# Patient Record
Sex: MALE | Race: WHITE | Employment: STUDENT | URBAN - METROPOLITAN AREA
[De-identification: names, ages, dates, MRNs, and addresses within clinical notes are randomized per-mention and may not be internally consistent; named-entity substitution may affect disease eponyms.]

---

## 2021-03-15 ENCOUNTER — HOSPITAL ENCOUNTER (EMERGENCY)
Facility: CLINIC | Age: 21
Discharge: HOME | End: 2021-03-15
Attending: EMERGENCY MEDICINE | Admitting: EMERGENCY MEDICINE
Payer: COMMERCIAL

## 2021-03-15 VITALS
SYSTOLIC BLOOD PRESSURE: 129 MMHG | RESPIRATION RATE: 20 BRPM | TEMPERATURE: 97.1 F | DIASTOLIC BLOOD PRESSURE: 81 MMHG | HEART RATE: 79 BPM | OXYGEN SATURATION: 96 %

## 2021-03-15 DIAGNOSIS — N23 RENAL COLIC: ICD-10-CM

## 2021-03-15 PROCEDURE — 258N000003 HC RX IP 258 OP 636: Performed by: EMERGENCY MEDICINE

## 2021-03-15 PROCEDURE — 96361 HYDRATE IV INFUSION ADD-ON: CPT | Performed by: EMERGENCY MEDICINE

## 2021-03-15 PROCEDURE — 250N000011 HC RX IP 250 OP 636: Performed by: EMERGENCY MEDICINE

## 2021-03-15 PROCEDURE — 96374 THER/PROPH/DIAG INJ IV PUSH: CPT | Performed by: EMERGENCY MEDICINE

## 2021-03-15 PROCEDURE — 99285 EMERGENCY DEPT VISIT HI MDM: CPT | Performed by: EMERGENCY MEDICINE

## 2021-03-15 PROCEDURE — 96376 TX/PRO/DX INJ SAME DRUG ADON: CPT | Performed by: EMERGENCY MEDICINE

## 2021-03-15 PROCEDURE — 250N000013 HC RX MED GY IP 250 OP 250 PS 637: Performed by: EMERGENCY MEDICINE

## 2021-03-15 PROCEDURE — 96375 TX/PRO/DX INJ NEW DRUG ADDON: CPT | Performed by: EMERGENCY MEDICINE

## 2021-03-15 PROCEDURE — 99285 EMERGENCY DEPT VISIT HI MDM: CPT | Mod: 25 | Performed by: EMERGENCY MEDICINE

## 2021-03-15 RX ORDER — OXYCODONE HYDROCHLORIDE 5 MG/1
5 TABLET ORAL EVERY 6 HOURS PRN
Qty: 12 TABLET | Refills: 0 | Status: SHIPPED | OUTPATIENT
Start: 2021-03-15

## 2021-03-15 RX ORDER — HYDROMORPHONE HYDROCHLORIDE 1 MG/ML
0.5 INJECTION, SOLUTION INTRAMUSCULAR; INTRAVENOUS; SUBCUTANEOUS ONCE
Status: COMPLETED | OUTPATIENT
Start: 2021-03-15 | End: 2021-03-15

## 2021-03-15 RX ORDER — TAMSULOSIN HYDROCHLORIDE 0.4 MG/1
0.4 CAPSULE ORAL ONCE
Status: COMPLETED | OUTPATIENT
Start: 2021-03-15 | End: 2021-03-15

## 2021-03-15 RX ORDER — ONDANSETRON 2 MG/ML
4 INJECTION INTRAMUSCULAR; INTRAVENOUS ONCE
Status: COMPLETED | OUTPATIENT
Start: 2021-03-15 | End: 2021-03-15

## 2021-03-15 RX ORDER — OXYCODONE HYDROCHLORIDE 5 MG/1
5 TABLET ORAL ONCE
Status: COMPLETED | OUTPATIENT
Start: 2021-03-15 | End: 2021-03-15

## 2021-03-15 RX ORDER — ONDANSETRON 4 MG/1
4 TABLET, ORALLY DISINTEGRATING ORAL EVERY 6 HOURS PRN
Qty: 10 TABLET | Refills: 0 | Status: SHIPPED | OUTPATIENT
Start: 2021-03-15 | End: 2021-03-18

## 2021-03-15 RX ORDER — TAMSULOSIN HYDROCHLORIDE 0.4 MG/1
0.4 CAPSULE ORAL DAILY
Qty: 10 CAPSULE | Refills: 0 | Status: SHIPPED | OUTPATIENT
Start: 2021-03-15 | End: 2021-03-25

## 2021-03-15 RX ADMIN — ONDANSETRON 4 MG: 2 INJECTION INTRAMUSCULAR; INTRAVENOUS at 16:16

## 2021-03-15 RX ADMIN — TAMSULOSIN HYDROCHLORIDE 0.4 MG: 0.4 CAPSULE ORAL at 15:40

## 2021-03-15 RX ADMIN — HYDROMORPHONE HYDROCHLORIDE 0.5 MG: 1 INJECTION, SOLUTION INTRAMUSCULAR; INTRAVENOUS; SUBCUTANEOUS at 16:17

## 2021-03-15 RX ADMIN — ONDANSETRON 4 MG: 2 INJECTION INTRAMUSCULAR; INTRAVENOUS at 15:36

## 2021-03-15 RX ADMIN — HYDROMORPHONE HYDROCHLORIDE 0.5 MG: 1 INJECTION, SOLUTION INTRAMUSCULAR; INTRAVENOUS; SUBCUTANEOUS at 15:38

## 2021-03-15 RX ADMIN — OXYCODONE HYDROCHLORIDE 5 MG: 5 TABLET ORAL at 16:34

## 2021-03-15 RX ADMIN — SODIUM CHLORIDE 1000 ML: 9 INJECTION, SOLUTION INTRAVENOUS at 15:36

## 2021-03-15 ASSESSMENT — ENCOUNTER SYMPTOMS
ABDOMINAL PAIN: 1
NAUSEA: 1

## 2021-03-15 NOTE — ED TRIAGE NOTES
Patient awake and alert. Respirations even and unlabored. Skin warm and dry. Lower midline abdominal pain. Seen at the [facility] Diagnosed with kidney stone (CT results, 2 x 2 x 2 mm calculus at the left urethrovesical junction resulting in mild obstructive changes). Three emesis today. Patient spoke with uncle who is a surgeon and instructed patient to come to the emergency room due to the severe pain, size of stone, and difficultly voiding.

## 2021-03-15 NOTE — DISCHARGE INSTRUCTIONS
Your prescriptions were E scribed to our discharge pharmacy for pickup.    Strain your urine for stones and save any found for your follow-up appointment.    Please make an appointment to follow up with the urology referral made for you by the urgency center or with our Urology Clinic (phone: [phone]) in the next week for recheck.    Return to the ER for worsening or fever.

## 2021-03-15 NOTE — ED PROVIDER NOTES
[facility] EMERGENCY DEPARTMENT ([facility])  3/15/21  History     Chief Complaint   Patient presents with     Flank Pain     The history is provided by the patient and medical records.     Nick Villagran is a 21 year old male who presents the Emergency Department after being seen at urgency [facility] this morning.  Patient states that yesterday morning he developed some left lower quadrant abdominal pain and thought it was just a stomachache.  Patient states he woke up this morning and the pain was worse associated with nausea.  Patient was seen at the urgency center and was diagnosed with a left-sided 2 mm kidney stone at the UV junction.  Patient was given Toradol intravenously some Zofran and some oxycodone to go home.  The patient was noted on their CT scan to have some mild left-sided hydronephrosis and hydroureter due to the stone.  Patient's other laboratory results done at the urgency room revealed a creatinine of 0.9 and otherwise normal electrolytes with a hemoglobin of 16.2, a white count of 4.5 and urinalysis revealing  red cells with no white cells and no bacteria.  Patient went home from the urgency center and did not fill his oxycodone prescription.  He states the pain worsened and he called his surgeon relative who recommended he come to the ER for further treatment.  Patient states he got no IV fluids and no Flomax during his treatment at the urgency center.      EXAM: CT ABDOMEN PELVIS STONE PROTOCOL WO  LOCATION: [facility] [place]  DATE/TIME: 3/15/2021 11:44 AM    INDICATION: Left flank pain.  COMPARISON: None.  TECHNIQUE: CT scan of the abdomen and pelvis was performed without oral or IV contrast. Multiplanar reformats were obtained. Dose reduction techniques were used.  CONTRAST: None.    FINDINGS:   LOWER CHEST: Normal.    HEPATOBILIARY: Noncontrast images are unremarkable.    PANCREAS: Normal.    SPLEEN: Normal.    ADRENAL GLANDS: Normal.    KIDNEY/BLADDER: There is  mild left-sided hydronephrosis and hydroureter due to a 2 x 2 by 2 mm calculus at the left ureterovesical junction. No perinephric stranding. There is another punctate intrarenal calculus in the upper pole of the left kidney. No stone burden on the right. No bladder calculi.    BOWEL: Sliver of free fluid in the pelvis. Appendix is normal.    LYMPH NODES: Normal.    VASCULATURE: Unremarkable.    PELVIC ORGANS: Normal.    MUSCULOSKELETAL: Few scattered bone islands in the pelvis and both femurs.    IMPRESSION:   1.  There is a 2 x 2 x 2 mm calculus at the left ureterovesical junction resulting in mild obstructive changes. There is an additional punctate calculus upper pole of the left kidney.    Component Name  3/15/2021     51 - 100 (A)   None Seen   None Seen   Few   Mucus Present   Chiquis (A)   Slightly Cloudy (A)   1.025   6.0   Normal   100 (A)   Negative   Trace (A)   Abnormal (A)   Large (A)   Negative   Negative   RBC   WBC   BACTERIA                    EPITHELIAL CELLS            OTHER   COLOR                       CLARITY                     SPECIFIC GRAVITY,URINE      PH,URINE                    UROBILINOGEN,QUALITATIVE    PROTEIN, URINE   GLUCOSE, URINE   KETONES,URINE               BILIRUBIN,URINE             OCCULT BLOOD,URINE          NITRITE                     LEUKOCYTE ESTERASE              This part of the document was transcribed by [employer].    History reviewed. No pertinent past medical history.    History reviewed. No pertinent surgical history.    No family history on file.    Social History     Tobacco Use     Smoking status: Not on file   Substance Use Topics     Alcohol use: Not on file        No Known Allergies      Review of Systems   Gastrointestinal: Positive for abdominal pain and nausea.   All other systems reviewed and are negative.    A complete review of systems was performed with pertinent positives and negatives noted in the HPI, and all other systems  negative.    Physical Exam   BP: (!) 147/98  Pulse: 89  Temp: 97.1  F (36.2  C)  Resp: 20  SpO2: 100 %  Physical Exam  Vitals signs and nursing note reviewed.   HENT:      Head: Atraumatic.   Eyes:      Pupils: Pupils are equal, round, and reactive to light.   Neck:      Musculoskeletal: Neck supple.   Cardiovascular:      Heart sounds: Normal heart sounds.   Pulmonary:      Breath sounds: Normal breath sounds.   Abdominal:      Palpations: Abdomen is soft.      Tenderness: There is no abdominal tenderness.   Neurological:      General: No focal deficit present.      Mental Status: He is alert and oriented to person, place, and time.   Psychiatric:         Mood and Affect: Mood normal.             ED Course      Procedures        CT and labs were reviewed from the urgency Center    Medications   sodium chloride (PF) 0.9% PF flush 3 mL (has no administration in time range)   sodium chloride (PF) 0.9% PF flush 3 mL (has no administration in time range)   0.9% sodium chloride BOLUS (0 mLs Intravenous Stopped 3/15/21 1618)   HYDROmorphone (PF) (DILAUDID) injection 0.5 mg (0.5 mg Intravenous Given 3/15/21 1538)   oxyCODONE (ROXICODONE) tablet 5 mg (5 mg Oral Given 3/15/21 1634)   tamsulosin (FLOMAX) capsule 0.4 mg (0.4 mg Oral Given 3/15/21 1540)   ondansetron (ZOFRAN) injection 4 mg (4 mg Intravenous Given 3/15/21 1536)   HYDROmorphone (PF) (DILAUDID) injection 0.5 mg (0.5 mg Intravenous Given 3/15/21 1617)   ondansetron (ZOFRAN) injection 4 mg (4 mg Intravenous Given 3/15/21 1616)        Assessments & Plan (with Medical Decision Making)     I have reviewed the nursing notes. I have reviewed the findings, diagnosis, plan and need for follow up with the patient.    New Prescriptions    ONDANSETRON (ZOFRAN ODT) 4 MG ODT TAB    Take 1 tablet (4 mg) by mouth every 6 hours as needed for nausea    OXYCODONE (ROXICODONE) 5 MG TABLET    Take 1 tablet (5 mg) by mouth every 6 hours as needed for pain    TAMSULOSIN (FLOMAX) 0.4 MG  CAPSULE    Take 1 capsule (0.4 mg) by mouth daily for 10 doses   Patient's oxycodone prescription from the urgency Center was ripped up and recycled and and in its place I sent a prescription E scribed to our discharge pharmacy    Final diagnoses:   Renal colic - with 2mm Left UVJ stone     Your prescriptions were E scribed to our discharge pharmacy for pickup.    Strain your urine for stones and save any found for your follow-up appointment.    Please make an appointment to follow up with the urology referral made for you by the urgency center or with our Urology Clinic (phone: [phone]) in the next week for recheck.    Return to the ER for worsening or fever.    Routine discharge instructions were given for this diagnosis  --  Miguelangel Desai MD  [facility] EMERGENCY DEPARTMENT  3/15/2021     Miguelangel Downing MD  03/15/21 2832

## 2021-03-17 ENCOUNTER — HOSPITAL ENCOUNTER (EMERGENCY)
Facility: CLINIC | Age: 21
Discharge: HOME | End: 2021-03-17
Attending: EMERGENCY MEDICINE | Admitting: EMERGENCY MEDICINE
Payer: COMMERCIAL

## 2021-03-17 VITALS
OXYGEN SATURATION: 100 % | HEART RATE: 56 BPM | TEMPERATURE: 98.1 F | DIASTOLIC BLOOD PRESSURE: 85 MMHG | WEIGHT: 178.2 LBS | BODY MASS INDEX: 22.16 KG/M2 | SYSTOLIC BLOOD PRESSURE: 138 MMHG | HEIGHT: 75 IN | RESPIRATION RATE: 16 BRPM

## 2021-03-17 DIAGNOSIS — N20.1 URETERAL STONE: ICD-10-CM

## 2021-03-17 DIAGNOSIS — N23 RENAL COLIC: ICD-10-CM

## 2021-03-17 LAB
ALBUMIN UR-MCNC: NEGATIVE MG/DL
ANION GAP SERPL CALCULATED.3IONS-SCNC: 7 MMOL/L (ref 3–14)
APPEARANCE UR: CLEAR
BASOPHILS # BLD AUTO: 0 10E9/L (ref 0–0.2)
BASOPHILS NFR BLD AUTO: 0.8 %
BILIRUB UR QL STRIP: NEGATIVE
BUN SERPL-MCNC: 9 MG/DL (ref 7–30)
CALCIUM SERPL-MCNC: 9.7 MG/DL (ref 8.5–10.1)
CHLORIDE SERPL-SCNC: 105 MMOL/L (ref 94–109)
CO2 SERPL-SCNC: 26 MMOL/L (ref 20–32)
COLOR UR AUTO: ABNORMAL
CREAT SERPL-MCNC: 0.94 MG/DL (ref 0.66–1.25)
DIFFERENTIAL METHOD BLD: NORMAL
EOSINOPHIL # BLD AUTO: 0.1 10E9/L (ref 0–0.7)
EOSINOPHIL NFR BLD AUTO: 1 %
ERYTHROCYTE [DISTWIDTH] IN BLOOD BY AUTOMATED COUNT: 11.3 % (ref 10–15)
GFR SERPL CREATININE-BSD FRML MDRD: >90 ML/MIN/{1.73_M2}
GLUCOSE SERPL-MCNC: 93 MG/DL (ref 70–99)
GLUCOSE UR STRIP-MCNC: NEGATIVE MG/DL
HCT VFR BLD AUTO: 44 % (ref 40–53)
HGB BLD-MCNC: 15.5 G/DL (ref 13.3–17.7)
HGB UR QL STRIP: ABNORMAL
IMM GRANULOCYTES # BLD: 0 10E9/L (ref 0–0.4)
IMM GRANULOCYTES NFR BLD: 0.4 %
KETONES UR STRIP-MCNC: NEGATIVE MG/DL
LEUKOCYTE ESTERASE UR QL STRIP: NEGATIVE
LYMPHOCYTES # BLD AUTO: 1.4 10E9/L (ref 0.8–5.3)
LYMPHOCYTES NFR BLD AUTO: 26 %
MCH RBC QN AUTO: 30.5 PG (ref 26.5–33)
MCHC RBC AUTO-ENTMCNC: 35.2 G/DL (ref 31.5–36.5)
MCV RBC AUTO: 87 FL (ref 78–100)
MONOCYTES # BLD AUTO: 0.5 10E9/L (ref 0–1.3)
MONOCYTES NFR BLD AUTO: 9 %
MUCOUS THREADS #/AREA URNS LPF: PRESENT /LPF
NEUTROPHILS # BLD AUTO: 3.3 10E9/L (ref 1.6–8.3)
NEUTROPHILS NFR BLD AUTO: 62.8 %
NITRATE UR QL: NEGATIVE
NRBC # BLD AUTO: 0 10*3/UL
NRBC BLD AUTO-RTO: 0 /100
PH UR STRIP: 7.5 PH (ref 5–7)
PLATELET # BLD AUTO: 212 10E9/L (ref 150–450)
POTASSIUM SERPL-SCNC: 3.7 MMOL/L (ref 3.4–5.3)
RBC # BLD AUTO: 5.08 10E12/L (ref 4.4–5.9)
RBC #/AREA URNS AUTO: 17 /HPF (ref 0–2)
SODIUM SERPL-SCNC: 138 MMOL/L (ref 133–144)
SOURCE: ABNORMAL
SP GR UR STRIP: 1.01 (ref 1–1.03)
SQUAMOUS #/AREA URNS AUTO: 0 /HPF (ref 0–1)
UROBILINOGEN UR STRIP-MCNC: NORMAL MG/DL (ref 0–2)
WBC # BLD AUTO: 5.2 10E9/L (ref 4–11)
WBC #/AREA URNS AUTO: 0 /HPF (ref 0–5)

## 2021-03-17 PROCEDURE — 85025 COMPLETE CBC W/AUTO DIFF WBC: CPT | Performed by: EMERGENCY MEDICINE

## 2021-03-17 PROCEDURE — 96375 TX/PRO/DX INJ NEW DRUG ADDON: CPT | Performed by: EMERGENCY MEDICINE

## 2021-03-17 PROCEDURE — 96361 HYDRATE IV INFUSION ADD-ON: CPT | Performed by: EMERGENCY MEDICINE

## 2021-03-17 PROCEDURE — 99282 EMERGENCY DEPT VISIT SF MDM: CPT | Performed by: PHYSICIAN ASSISTANT

## 2021-03-17 PROCEDURE — 99285 EMERGENCY DEPT VISIT HI MDM: CPT | Performed by: EMERGENCY MEDICINE

## 2021-03-17 PROCEDURE — 99285 EMERGENCY DEPT VISIT HI MDM: CPT | Mod: 25 | Performed by: EMERGENCY MEDICINE

## 2021-03-17 PROCEDURE — 96374 THER/PROPH/DIAG INJ IV PUSH: CPT | Performed by: EMERGENCY MEDICINE

## 2021-03-17 PROCEDURE — 96376 TX/PRO/DX INJ SAME DRUG ADON: CPT | Performed by: EMERGENCY MEDICINE

## 2021-03-17 PROCEDURE — 258N000003 HC RX IP 258 OP 636: Performed by: EMERGENCY MEDICINE

## 2021-03-17 PROCEDURE — 80048 BASIC METABOLIC PNL TOTAL CA: CPT | Performed by: EMERGENCY MEDICINE

## 2021-03-17 PROCEDURE — 250N000011 HC RX IP 250 OP 636: Performed by: EMERGENCY MEDICINE

## 2021-03-17 PROCEDURE — 81001 URINALYSIS AUTO W/SCOPE: CPT | Performed by: EMERGENCY MEDICINE

## 2021-03-17 PROCEDURE — 250N000013 HC RX MED GY IP 250 OP 250 PS 637: Performed by: EMERGENCY MEDICINE

## 2021-03-17 RX ORDER — HYDROMORPHONE HYDROCHLORIDE 1 MG/ML
0.5 INJECTION, SOLUTION INTRAMUSCULAR; INTRAVENOUS; SUBCUTANEOUS
Status: DISCONTINUED | OUTPATIENT
Start: 2021-03-17 | End: 2021-03-17 | Stop reason: HOSPADM

## 2021-03-17 RX ORDER — KETOROLAC TROMETHAMINE 30 MG/ML
30 INJECTION, SOLUTION INTRAMUSCULAR; INTRAVENOUS ONCE
Status: COMPLETED | OUTPATIENT
Start: 2021-03-17 | End: 2021-03-17

## 2021-03-17 RX ORDER — PROCHLORPERAZINE 25 MG
25 SUPPOSITORY, RECTAL RECTAL EVERY 12 HOURS PRN
Qty: 8 SUPPOSITORY | Refills: 0 | Status: SHIPPED | OUTPATIENT
Start: 2021-03-17

## 2021-03-17 RX ORDER — TAMSULOSIN HYDROCHLORIDE 0.4 MG/1
0.4 CAPSULE ORAL DAILY
Status: DISCONTINUED | OUTPATIENT
Start: 2021-03-17 | End: 2021-03-17 | Stop reason: HOSPADM

## 2021-03-17 RX ORDER — ONDANSETRON 2 MG/ML
4 INJECTION INTRAMUSCULAR; INTRAVENOUS
Status: COMPLETED | OUTPATIENT
Start: 2021-03-17 | End: 2021-03-17

## 2021-03-17 RX ADMIN — HYDROMORPHONE HYDROCHLORIDE 0.5 MG: 1 INJECTION, SOLUTION INTRAMUSCULAR; INTRAVENOUS; SUBCUTANEOUS at 12:00

## 2021-03-17 RX ADMIN — KETOROLAC TROMETHAMINE 30 MG: 30 INJECTION, SOLUTION INTRAMUSCULAR; INTRAVENOUS at 11:22

## 2021-03-17 RX ADMIN — PROCHLORPERAZINE EDISYLATE 5 MG: 5 INJECTION INTRAMUSCULAR; INTRAVENOUS at 12:58

## 2021-03-17 RX ADMIN — TAMSULOSIN HYDROCHLORIDE 0.4 MG: 0.4 CAPSULE ORAL at 12:58

## 2021-03-17 RX ADMIN — ONDANSETRON 4 MG: 2 INJECTION INTRAMUSCULAR; INTRAVENOUS at 11:00

## 2021-03-17 RX ADMIN — SODIUM CHLORIDE 2000 ML: 9 INJECTION, SOLUTION INTRAVENOUS at 11:23

## 2021-03-17 RX ADMIN — HYDROMORPHONE HYDROCHLORIDE 0.5 MG: 1 INJECTION, SOLUTION INTRAMUSCULAR; INTRAVENOUS; SUBCUTANEOUS at 14:27

## 2021-03-17 ASSESSMENT — ENCOUNTER SYMPTOMS
DIAPHORESIS: 1
HEADACHES: 0
VOMITING: 1
FEVER: 0
CONFUSION: 0
EYE REDNESS: 0
COUGH: 0
DIFFICULTY URINATING: 0
SHORTNESS OF BREATH: 0
NAUSEA: 1
ARTHRALGIAS: 0
COLOR CHANGE: 0
ABDOMINAL PAIN: 0
NECK STIFFNESS: 0
FLANK PAIN: 1

## 2021-03-17 ASSESSMENT — MIFFLIN-ST. JEOR: SCORE: 1898.94

## 2021-03-17 NOTE — CONSULTS
Urology Consult History and Physical    Name: Nick Villagran    MRN: [id number]   YOB: 2000       We were asked to see Nick Villagran at the request of Dr. Turk for evaluation and treatment of the following chief complaint:          Chief Complaint:   Left obstructing ureteral stone    History is obtained from patient and review of records          History of Present Illness:   Nick Villagran is a 21 year old male with developed sudden LLQ abdominal pain on 3/15/21 and presented to an [facility] Emergency room for LLQ abdominal pain.  He was found on CT to have a [id number] left UVJ stone.  Labs shows normal creatinine 0.9mg/dL, WBC 4.5, and UA with blood only.  He was discharged on flomax when his pain was controlled, but re-presented to [facility] Ed the same day with worsened pain.  Again, his pain was able to be controlled and he was discharged on flomax, oxycodone and odansetron and given a urine strainer.     Today he re-presents to the [facility] ED with a pain flare associated with nausea and emesis.  Here in the ED his sCr is 0.94, WBC 5.2 and UA shows only RBCs.  There is no new imaging He denies dysuria and feels he is voiding normally. His urine has been intermittently dark-colored.  His pain is primarily in his LLQ and he has received toradol and dilaudid thus far.  His nausea is better with odansetron.  Last emesis was this morning.  No fevers, chills.     He has never previously had stones, but his uncle and grandfather have a strong stone Hx.    He has had surgeries for orthopedic injuries to his right elbow and 4 of his right toes.  He has never had abdominal surgery.  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            Past Medical History:   History reviewed. No pertinent past medical history.         Past Surgical History:   History reviewed. No pertinent surgical history.         Social History:     Social History     Tobacco Use     Smoking status:  Current Every Day Smoker     Types: Cigarettes     Smokeless tobacco: Never Used     Tobacco comment: 2 cigarettes a day   Substance Use Topics     Alcohol use: Yes     Comment: 4-5 drinks a week            Family History:   History reviewed. No pertinent family history.         Allergies:   No Known Allergies         Medications:     Current Facility-Administered Medications   Medication     HYDROmorphone (PF) (DILAUDID) injection 0.5 mg     Current Outpatient Medications   Medication Sig     oxyCODONE (ROXICODONE) 5 MG tablet Take 1 tablet (5 mg) by mouth every 6 hours as needed for pain     tamsulosin (FLOMAX) 0.4 MG capsule Take 1 capsule (0.4 mg) by mouth daily for 10 doses     ondansetron (ZOFRAN ODT) 4 MG ODT tab Take 1 tablet (4 mg) by mouth every 6 hours as needed for nausea             Review of Systems:    ROS: See HPI for pertinent details.  Remainder of 10-point ROS negative.         Physical Exam:   VS:  T: 98.1    HR: 91    BP: 157/100    RR: 16   GEN:  AOx3.  NAD.  Pleasant.  HEENT:  Sclerae anicteric.  Conjunctivae pink.  Moist mucous membranes  LUNGS: Non-labored breathing.  BACK:  No costoverterbral tenderness.  ABD:  Soft.  ND.  + mild left suprapubic tenderness. No rebound or guarding.  No surgical scars. No masses.    EXT:  Warm, well perfused.  No lower extremity edema bilaterally  SKIN:  Warm.  Dry.  No rashes.  NEURO:  CN grossly intact.  Normal gait    URINE: concentrated yellow          Data:   All laboratory data reviewed:    No results found for: PSA  Recent Labs   Lab 03/17/21  1055   WBC 5.2   HGB 15.5        Recent Labs   Lab 03/17/21  1055      POTASSIUM 3.7   CHLORIDE 105   CO2 26   BUN 9   CR 0.94   GLC 93   GRISELDA 9.7     No lab results found in last 7 days.    Invalid input(s): URINEBLOOD  No results found for this or any previous visit.    All pertinent imaging reviewed:  EXAM: CT ABDOMEN PELVIS STONE PROTOCOL WO  LOCATION: [facility] [place]  DATE/TIME:  3/15/2021 11:44 AM    INDICATION: Left flank pain.  COMPARISON: None.  TECHNIQUE: CT scan of the abdomen and pelvis was performed without oral or IV contrast. Multiplanar reformats were obtained. Dose reduction techniques were used.  CONTRAST: None.    FINDINGS:   LOWER CHEST: Normal.    HEPATOBILIARY: Noncontrast images are unremarkable.    PANCREAS: Normal.    SPLEEN: Normal.    ADRENAL GLANDS: Normal.    KIDNEY/BLADDER: There is mild left-sided hydronephrosis and hydroureter due to a 2 x 2 by 2 mm calculus at the left ureterovesical junction. No perinephric stranding. There is another punctate intrarenal calculus in the upper pole of the left kidney. No stone burden on the right. No bladder calculi.    BOWEL: Sliver of free fluid in the pelvis. Appendix is normal.    LYMPH NODES: Normal.    VASCULATURE: Unremarkable.    PELVIC ORGANS: Normal.    MUSCULOSKELETAL: Few scattered bone islands in the pelvis and both femurs.    IMPRESSION:   1.  There is a 2 x 2 x 2 mm calculus at the left ureterovesical junction resulting in mild obstructive changes. There is an additional punctate calculus upper pole of the left kidney.         Impression and Plan:   Impression / Plan:   Nick Villagran is a 21 year old male with a 2mm left UVJ stone (based on a CT report from 3/15/21 from the [facility] system).  He has been to the ED 3 times in the past 3 days for pain, nausea, emesis.  Labs have been normal      - Discussed with patient that it would be preferable if he could pass this tiny stone.    - OK to discharge with pain medications, antiemetics, flomax, urine strainer  - He will need followup with Urology to assure he passes the stone and to discuss possible metabolic urinalysis.  He has relatives who are stone-formers and now he is also producing stones.  We did discuss that he should drink at least 2L of water per day.     Discussed with Dr. Turk  Will discuss with Urology Staff, Dr. Kraft    Thank you for  the opportunity to participate in the care of Nick Villagran.     Jessy Andrew PA-C  Urology Physician Assistant  Personal Pager: [phone]    Please call Job Code:   [phone] to reach the Urology resident or PA on call - Weekdays  [phone] to reach the Urology resident or PA on call - Weeknights and weekends

## 2021-03-17 NOTE — ED TRIAGE NOTES
Pt has known kidney stones, seen Monday in ED. This morning, pt unable to urinate, intense pain in LLQ abdomen, and threw up oxy & Flomax this morning. Pt nauseated.

## 2021-03-17 NOTE — DISCHARGE INSTRUCTIONS
Continue straining urine until stone is retrieved  Continue daily Flomax daily.  Use ibuprofen 600 mg or 3 over-the-counter tablets every 6 hours, you may use oxycodone if you are still having pain.  Continue to use Zofran every 6 hours for nausea, if you continue vomiting, you also have Compazine suppositories that are currently at the pharmacy that you may use every 12 hours

## 2021-03-17 NOTE — ED PROVIDER NOTES
"    [place] EMERGENCY DEPARTMENT ([facility])  3/17/21  History     Chief Complaint   Patient presents with     Kidney Problem     The history is provided by the patient and medical records.     Nick Villagran is a 21 year old male who presents to the Emergency Department for evaluation of left flank pain. Patient reports he woke up this morning at approximately 9:25 AM with severe left sided flank pain. He reports he subsequently took a dose of oxycodone and Flomax at around 9:30 AM. Since waking up, he reports this left-sided flank pain has been slowly radiating into the left-groin region. Patient reports this is a \"pinpoint, stabbing\" flank pain. He also states that he has had some pain and difficulty with urination, and was only able to get \"dribbles\" out when he had attempted to urinate this morning. He also reports he has had 1 episode of vomiting this morning, after taking the oxycodone and Flomax. Patient reports he has been slightly diaphoretic this morning as well, but otherwise denies any fevers or chills. No cough, sore throat, or other URI symptoms. Of note, patient reports he was also seen here in the ED on Monday (3/15) due to left flank pains secondary to 2 known kidney stones. He reports that he did feel as if he passed the first kidney stone yesterday, after he noticed a \"red stone\". He was since able to urinate without pain and difficulty until this morning.  He reports that his pain today in the ED did feel worse than when he was evaluated on Monday.  Patient did subsequently have an additional episode of vomiting here in the ED as well.     Per review of the patient's medical record, they were seen at the urgency room in [place] on 3/15/2021 for abdominal pain. Patient was subsequently worked up with CT abdomen pelvis stone protocol which showed a 2 x 2 x 2 mm calculus at the left ureterovesical junction resulting in mild obstructive changes. Full impression noted below. Patient " was subsequently treated with 50 mg of IV Toradol, 4 mg of IV Zofran, and 5 mg of oral oxycodone.  Additional lab work showed no other alternative etiology, and no evidence of UTI.  Other labs were notable for a creatinine of 0.9.  Electrolytes were WNL.  Hemoglobin noted to be 16.2, and a white count of 4.5.  Patient was subsequently discharged with Flomax, and plan to strain urine for stone.  Patient was advised to follow-up with [facility] for lab analysis and repeat assessment.    Patient was then subsequently seen at the [facility] ED on 3/15/2021 for worsening pain in the setting of his known stones.  After leaving the emergency room, the patient reportedly called the surgeon who recommended he come to the ER for further treatment.  Patient was subsequently treated with 0.5 mg IV Dilaudid x2, 0.4 mg oral Flomax, and 4 mg IV Zofran x2 which was noted to have significantly helped his pain.  Patient was subsequently discharged with a prescription of oxycodone 5 mg to take as needed every 6 hours.    EXAM: CT ABDOMEN PELVIS STONE PROTOCOL WO ([facility])-3/15/2021  IMPRESSION:   1.  There is a 2 x 2 x 2 mm calculus at the left ureterovesical junction resulting in mild obstructive changes. There is an additional punctate calculus upper pole of the left kidney.    I have reviewed the Medications, Allergies, Past Medical and Surgical History, and Social History in the [facility] system.  PAST MEDICAL HISTORY: History reviewed. No pertinent past medical history.    PAST SURGICAL HISTORY: History reviewed. No pertinent surgical history.    Past medical history, past surgical history, medications, and allergies were reviewed with the patient. Additional pertinent items: None    FAMILY HISTORY: History reviewed. No pertinent family history.    SOCIAL HISTORY:   Social History     Tobacco Use     Smoking status: Current Every Day Smoker     Types: Cigarettes     Smokeless tobacco: Never Used     Tobacco  "comment: 2 cigarettes a day   Substance Use Topics     Alcohol use: Yes     Comment: 4-5 drinks a week     Social history was reviewed with the patient. Additional pertinent items: None      Patient's Medications   New Prescriptions    PROCHLORPERAZINE (COMPAZINE) 25 MG SUPPOSITORY    Place 1 suppository (25 mg) rectally every 12 hours as needed for nausea or vomiting   Previous Medications    ONDANSETRON (ZOFRAN ODT) 4 MG ODT TAB    Take 1 tablet (4 mg) by mouth every 6 hours as needed for nausea    OXYCODONE (ROXICODONE) 5 MG TABLET    Take 1 tablet (5 mg) by mouth every 6 hours as needed for pain    TAMSULOSIN (FLOMAX) 0.4 MG CAPSULE    Take 1 capsule (0.4 mg) by mouth daily for 10 doses   Modified Medications    No medications on file   Discontinued Medications    No medications on file        No Known Allergies     Review of Systems   Constitutional: Positive for diaphoresis. Negative for fever.   HENT: Negative for congestion.    Eyes: Negative for redness.   Respiratory: Negative for cough and shortness of breath.    Cardiovascular: Negative for chest pain.   Gastrointestinal: Positive for nausea and vomiting. Negative for abdominal pain.   Genitourinary: Positive for flank pain. Negative for difficulty urinating.   Musculoskeletal: Negative for arthralgias and neck stiffness.   Skin: Negative for color change.   Neurological: Negative for headaches.   Psychiatric/Behavioral: Negative for confusion.   All other systems reviewed and are negative.      Physical Exam   BP: (!) 157/100  Pulse: 91  Temp: 98.1  F (36.7  C)  Resp: 16  Height: 190.5 cm (6' 3\")  Weight: 80.8 kg (178 lb 3.2 oz)  SpO2: 99 %      Physical Exam  Vitals signs and nursing note reviewed.   Constitutional:       General: He is in acute distress.      Appearance: He is not diaphoretic.   HENT:      Head: Atraumatic.   Eyes:      General: No scleral icterus.     Pupils: Pupils are equal, round, and reactive to light.   Cardiovascular:      " Heart sounds: Normal heart sounds.   Pulmonary:      Effort: No respiratory distress.      Breath sounds: Normal breath sounds.   Abdominal:      General: Bowel sounds are normal.      Palpations: Abdomen is soft.      Tenderness: There is no abdominal tenderness.   Musculoskeletal:         General: No tenderness.   Skin:     General: Skin is warm.      Findings: No rash.         ED Course   11:13 AM  The patient was seen and examined by Mars Turk MD in Room ED24.        Procedures                           Results for orders placed or performed during the hospital encounter of 03/17/21 (from the past 24 hour(s))   CBC with platelets differential   Result Value Ref Range    WBC 5.2 4.0 - 11.0 10e9/L    RBC Count 5.08 4.4 - 5.9 10e12/L    Hemoglobin 15.5 13.3 - 17.7 g/dL    Hematocrit 44.0 40.0 - 53.0 %    MCV 87 78 - 100 fl    MCH 30.5 26.5 - 33.0 pg    MCHC 35.2 31.5 - 36.5 g/dL    RDW 11.3 10.0 - 15.0 %    Platelet Count 212 150 - 450 10e9/L    Diff Method Automated Method     % Neutrophils 62.8 %    % Lymphocytes 26.0 %    % Monocytes 9.0 %    % Eosinophils 1.0 %    % Basophils 0.8 %    % Immature Granulocytes 0.4 %    Nucleated RBCs 0 0 /100    Absolute Neutrophil 3.3 1.6 - 8.3 10e9/L    Absolute Lymphocytes 1.4 0.8 - 5.3 10e9/L    Absolute Monocytes 0.5 0.0 - 1.3 10e9/L    Absolute Eosinophils 0.1 0.0 - 0.7 10e9/L    Absolute Basophils 0.0 0.0 - 0.2 10e9/L    Abs Immature Granulocytes 0.0 0 - 0.4 10e9/L    Absolute Nucleated RBC 0.0    Basic metabolic panel   Result Value Ref Range    Sodium 138 133 - 144 mmol/L    Potassium 3.7 3.4 - 5.3 mmol/L    Chloride 105 94 - 109 mmol/L    Carbon Dioxide 26 20 - 32 mmol/L    Anion Gap 7 3 - 14 mmol/L    Glucose 93 70 - 99 mg/dL    Urea Nitrogen 9 7 - 30 mg/dL    Creatinine 0.94 0.66 - 1.25 mg/dL    GFR Estimate >90 >60 mL/min/[1.73_m2]    GFR Estimate If Black >90 >60 mL/min/[1.73_m2]    Calcium 9.7 8.5 - 10.1 mg/dL   UA reflex to Microscopic and Culture    Specimen:  Urine clean catch   Result Value Ref Range    Color Urine Straw     Appearance Urine Clear     Glucose Urine Negative NEG^Negative mg/dL    Bilirubin Urine Negative NEG^Negative    Ketones Urine Negative NEG^Negative mg/dL    Specific Gravity Urine 1.008 1.003 - 1.035    Blood Urine Moderate (A) NEG^Negative    pH Urine 7.5 (H) 5.0 - 7.0 pH    Protein Albumin Urine Negative NEG^Negative mg/dL    Urobilinogen mg/dL Normal 0.0 - 2.0 mg/dL    Nitrite Urine Negative NEG^Negative    Leukocyte Esterase Urine Negative NEG^Negative    Source Clean catch urine     RBC Urine 17 (H) 0 - 2 /HPF    WBC Urine 0 0 - 5 /HPF    Squamous Epithelial /HPF Urine 0 0 - 1 /HPF    Mucous Urine Present (A) NEG^Negative /LPF     Medications   HYDROmorphone (PF) (DILAUDID) injection 0.5 mg (0.5 mg Intravenous Given 3/17/21 1200)   tamsulosin (FLOMAX) capsule 0.4 mg (0.4 mg Oral Given 3/17/21 1258)   ondansetron (ZOFRAN) injection 4 mg (4 mg Intravenous Given 3/17/21 1100)   ketorolac (TORADOL) injection 30 mg (30 mg Intravenous Given 3/17/21 1122)   0.9% sodium chloride BOLUS (0 mLs Intravenous Stopped 3/17/21 1302)   prochlorperazine (COMPAZINE) injection 5 mg (5 mg Intravenous Given 3/17/21 1258)             Assessments & Plan (with Medical Decision Making)   21-year-old male who presents for evaluation of left flank pain radiating to left abdomen.  Differential included diverticulitis, renal colic, pyelonephritis, colitis, gastroenteritis, incarcerated hernia, muscular strain.  Exam revealed the patient be in mild distress with elevated blood pressure.  Review of records reveal recent similar visit at which time CT scan reviewed revealed 2 mm distal UVJ stone.  Laboratories were obtained including CBC and comprehensive metabolic panel both of which were normal.  Urinalysis revealed 17 red blood cells without evidence of infection.  Patient was treated with IV fluids, Toradol, Flomax, Dilaudid, Zofran, Compazine.  The case was discussed at  length with urology who recommends symptomatic treatment to include Flomax, antiemetics and pain control.  Patient will follow up with urology in the next 2 to 3 weeks, strain urine, push fluids and use medications as directed.    I have reviewed the nursing notes.    I have reviewed the findings, diagnosis, plan and need for follow up with the patient.    New Prescriptions    PROCHLORPERAZINE (COMPAZINE) 25 MG SUPPOSITORY    Place 1 suppository (25 mg) rectally every 12 hours as needed for nausea or vomiting       Final diagnoses:   Renal colic   Ureteral stone   I, Charly Angelo, am serving as a trained medical scribe to document services personally performed by Mars Turk MD, based on the provider's statements to me.      I, Mars Turk MD, was physically present and have reviewed and verified the accuracy of this note documented by Charly nAgelo.     3/17/2021   [facility] EMERGENCY DEPARTMENT     Cornel Turk MD  03/17/21 1429       Cornel Turk MD  03/17/21 1429

## 2021-03-18 ENCOUNTER — TELEPHONE (OUTPATIENT)
Dept: UROLOGY | Facility: CLINIC | Age: 21
End: 2021-03-18

## 2021-03-18 NOTE — TELEPHONE ENCOUNTER
----- Message from Karma Luna RN sent at 3/17/2021  4:27 PM CDT -----  Regarding: FW: 2mm UVJ stone  Can you please help this patient get set up to see Violette. Can be a virtual visit.  Thank you!  ----- Message -----  From: Fariba Andrew PA  Sent: 3/17/2021   3:56 PM CDT  To: Karma Luna RN  Subject: 2mm UVJ stone                                    Jonah Parada:    I saw this young andra in the ED today with a 2mm UVJ stone.    He is going to try to pass it.      Do you think he could get an appt with Violette or another provider?    Thanks!  Jessy

## 2021-03-26 ENCOUNTER — TELEPHONE (OUTPATIENT)
Dept: UROLOGY | Facility: CLINIC | Age: 21
End: 2021-03-26

## 2021-06-20 ENCOUNTER — HEALTH MAINTENANCE LETTER (OUTPATIENT)
Age: 21
End: 2021-06-20

## 2021-10-11 ENCOUNTER — HEALTH MAINTENANCE LETTER (OUTPATIENT)
Age: 21
End: 2021-10-11

## 2022-07-17 ENCOUNTER — HEALTH MAINTENANCE LETTER (OUTPATIENT)
Age: 22
End: 2022-07-17

## 2022-09-25 ENCOUNTER — HEALTH MAINTENANCE LETTER (OUTPATIENT)
Age: 22
End: 2022-09-25

## 2023-08-05 ENCOUNTER — HEALTH MAINTENANCE LETTER (OUTPATIENT)
Age: 23
End: 2023-08-05

## 2025-08-20 ENCOUNTER — OFFICE VISIT (OUTPATIENT)
Dept: OCCUPATIONAL MEDICINE | Age: 25
End: 2025-08-20

## 2025-08-20 DIAGNOSIS — Z02.83 ENCOUNTER FOR DRUG SCREENING: Primary | ICD-10-CM

## 2025-08-20 PROCEDURE — OH035 DOT/N-DOT DS COLLECTION ONLY (ALL TYPES): Performed by: NURSE PRACTITIONER

## 2025-08-20 PROCEDURE — OH112 RAPID TEST DRUG SCREEN COLLECTION: Performed by: NURSE PRACTITIONER
